# Patient Record
Sex: MALE | Race: WHITE | NOT HISPANIC OR LATINO | Employment: OTHER | URBAN - METROPOLITAN AREA
[De-identification: names, ages, dates, MRNs, and addresses within clinical notes are randomized per-mention and may not be internally consistent; named-entity substitution may affect disease eponyms.]

---

## 2021-12-15 ENCOUNTER — HOSPITAL ENCOUNTER (EMERGENCY)
Facility: HOSPITAL | Age: 61
Discharge: HOME | End: 2021-12-15
Attending: STUDENT IN AN ORGANIZED HEALTH CARE EDUCATION/TRAINING PROGRAM | Admitting: STUDENT IN AN ORGANIZED HEALTH CARE EDUCATION/TRAINING PROGRAM

## 2021-12-15 ENCOUNTER — APPOINTMENT (OUTPATIENT)
Dept: GENERAL RADIOLOGY | Facility: HOSPITAL | Age: 61
End: 2021-12-15

## 2021-12-15 VITALS
TEMPERATURE: 98.4 F | HEART RATE: 69 BPM | BODY MASS INDEX: 25.74 KG/M2 | OXYGEN SATURATION: 95 % | HEIGHT: 67 IN | WEIGHT: 164 LBS | DIASTOLIC BLOOD PRESSURE: 81 MMHG | SYSTOLIC BLOOD PRESSURE: 124 MMHG | RESPIRATION RATE: 18 BRPM

## 2021-12-15 DIAGNOSIS — R07.9 CHEST PAIN, UNSPECIFIED TYPE: Primary | ICD-10-CM

## 2021-12-15 DIAGNOSIS — K30 ACID INDIGESTION: ICD-10-CM

## 2021-12-15 DIAGNOSIS — K21.9 GASTROESOPHAGEAL REFLUX DISEASE, UNSPECIFIED WHETHER ESOPHAGITIS PRESENT: ICD-10-CM

## 2021-12-15 LAB
ALBUMIN SERPL-MCNC: 4.3 G/DL (ref 3.5–5.2)
ALBUMIN/GLOB SERPL: 1.3 G/DL
ALP SERPL-CCNC: 70 U/L (ref 39–117)
ALT SERPL W P-5'-P-CCNC: 6 U/L (ref 1–41)
ANION GAP SERPL CALCULATED.3IONS-SCNC: 10 MMOL/L (ref 5–15)
AST SERPL-CCNC: 13 U/L (ref 1–40)
BASOPHILS # BLD AUTO: 0.1 10*3/MM3 (ref 0–0.2)
BASOPHILS NFR BLD AUTO: 1.2 % (ref 0–1.5)
BILIRUB SERPL-MCNC: 0.2 MG/DL (ref 0–1.2)
BUN SERPL-MCNC: 11 MG/DL (ref 8–23)
BUN/CREAT SERPL: 12.5 (ref 7–25)
CALCIUM SPEC-SCNC: 9.5 MG/DL (ref 8.6–10.5)
CHLORIDE SERPL-SCNC: 101 MMOL/L (ref 98–107)
CO2 SERPL-SCNC: 29 MMOL/L (ref 22–29)
CREAT SERPL-MCNC: 0.88 MG/DL (ref 0.76–1.27)
DEPRECATED RDW RBC AUTO: 43.5 FL (ref 37–54)
EOSINOPHIL # BLD AUTO: 0.18 10*3/MM3 (ref 0–0.4)
EOSINOPHIL NFR BLD AUTO: 2.2 % (ref 0.3–6.2)
ERYTHROCYTE [DISTWIDTH] IN BLOOD BY AUTOMATED COUNT: 13.3 % (ref 12.3–15.4)
GFR SERPL CREATININE-BSD FRML MDRD: 88 ML/MIN/1.73
GLOBULIN UR ELPH-MCNC: 3.2 GM/DL
GLUCOSE SERPL-MCNC: 112 MG/DL (ref 65–99)
HCT VFR BLD AUTO: 46.1 % (ref 37.5–51)
HGB BLD-MCNC: 15.4 G/DL (ref 13–17.7)
IMM GRANULOCYTES # BLD AUTO: 0.02 10*3/MM3 (ref 0–0.05)
IMM GRANULOCYTES NFR BLD AUTO: 0.2 % (ref 0–0.5)
LYMPHOCYTES # BLD AUTO: 2.13 10*3/MM3 (ref 0.7–3.1)
LYMPHOCYTES NFR BLD AUTO: 26.4 % (ref 19.6–45.3)
MAGNESIUM SERPL-MCNC: 2.3 MG/DL (ref 1.6–2.4)
MCH RBC QN AUTO: 29.7 PG (ref 26.6–33)
MCHC RBC AUTO-ENTMCNC: 33.4 G/DL (ref 31.5–35.7)
MCV RBC AUTO: 89 FL (ref 79–97)
MONOCYTES # BLD AUTO: 0.45 10*3/MM3 (ref 0.1–0.9)
MONOCYTES NFR BLD AUTO: 5.6 % (ref 5–12)
NEUTROPHILS NFR BLD AUTO: 5.18 10*3/MM3 (ref 1.7–7)
NEUTROPHILS NFR BLD AUTO: 64.4 % (ref 42.7–76)
NRBC BLD AUTO-RTO: 0 /100 WBC (ref 0–0.2)
NT-PROBNP SERPL-MCNC: 133.8 PG/ML (ref 0–900)
PLATELET # BLD AUTO: 272 10*3/MM3 (ref 140–450)
PMV BLD AUTO: 10.9 FL (ref 6–12)
POTASSIUM SERPL-SCNC: 4.1 MMOL/L (ref 3.5–5.2)
PROT SERPL-MCNC: 7.5 G/DL (ref 6–8.5)
RBC # BLD AUTO: 5.18 10*6/MM3 (ref 4.14–5.8)
SODIUM SERPL-SCNC: 140 MMOL/L (ref 136–145)
TROPONIN T SERPL-MCNC: <0.01 NG/ML (ref 0–0.03)
WBC NRBC COR # BLD: 8.06 10*3/MM3 (ref 3.4–10.8)

## 2021-12-15 PROCEDURE — 80053 COMPREHEN METABOLIC PANEL: CPT | Performed by: STUDENT IN AN ORGANIZED HEALTH CARE EDUCATION/TRAINING PROGRAM

## 2021-12-15 PROCEDURE — 83880 ASSAY OF NATRIURETIC PEPTIDE: CPT | Performed by: STUDENT IN AN ORGANIZED HEALTH CARE EDUCATION/TRAINING PROGRAM

## 2021-12-15 PROCEDURE — 36415 COLL VENOUS BLD VENIPUNCTURE: CPT

## 2021-12-15 PROCEDURE — 99284 EMERGENCY DEPT VISIT MOD MDM: CPT

## 2021-12-15 PROCEDURE — 83735 ASSAY OF MAGNESIUM: CPT | Performed by: STUDENT IN AN ORGANIZED HEALTH CARE EDUCATION/TRAINING PROGRAM

## 2021-12-15 PROCEDURE — 71045 X-RAY EXAM CHEST 1 VIEW: CPT

## 2021-12-15 PROCEDURE — 25010000002 KETOROLAC TROMETHAMINE PER 15 MG: Performed by: STUDENT IN AN ORGANIZED HEALTH CARE EDUCATION/TRAINING PROGRAM

## 2021-12-15 PROCEDURE — 96374 THER/PROPH/DIAG INJ IV PUSH: CPT

## 2021-12-15 PROCEDURE — 84484 ASSAY OF TROPONIN QUANT: CPT | Performed by: STUDENT IN AN ORGANIZED HEALTH CARE EDUCATION/TRAINING PROGRAM

## 2021-12-15 PROCEDURE — 85025 COMPLETE CBC W/AUTO DIFF WBC: CPT | Performed by: STUDENT IN AN ORGANIZED HEALTH CARE EDUCATION/TRAINING PROGRAM

## 2021-12-15 PROCEDURE — 93005 ELECTROCARDIOGRAM TRACING: CPT | Performed by: STUDENT IN AN ORGANIZED HEALTH CARE EDUCATION/TRAINING PROGRAM

## 2021-12-15 RX ORDER — ALUMINA, MAGNESIA, AND SIMETHICONE 2400; 2400; 240 MG/30ML; MG/30ML; MG/30ML
15 SUSPENSION ORAL ONCE
Status: COMPLETED | OUTPATIENT
Start: 2021-12-15 | End: 2021-12-15

## 2021-12-15 RX ORDER — FAMOTIDINE 20 MG/1
20 TABLET, FILM COATED ORAL 2 TIMES DAILY
Qty: 60 TABLET | Refills: 0 | Status: SHIPPED | OUTPATIENT
Start: 2021-12-15 | End: 2022-01-14

## 2021-12-15 RX ORDER — KETOROLAC TROMETHAMINE 30 MG/ML
15 INJECTION, SOLUTION INTRAMUSCULAR; INTRAVENOUS ONCE
Status: COMPLETED | OUTPATIENT
Start: 2021-12-15 | End: 2021-12-15

## 2021-12-15 RX ORDER — LIDOCAINE HYDROCHLORIDE 20 MG/ML
15 SOLUTION OROPHARYNGEAL ONCE
Status: COMPLETED | OUTPATIENT
Start: 2021-12-15 | End: 2021-12-15

## 2021-12-15 RX ADMIN — KETOROLAC TROMETHAMINE 15 MG: 30 INJECTION, SOLUTION INTRAMUSCULAR at 03:55

## 2021-12-15 RX ADMIN — ALUMINUM HYDROXIDE, MAGNESIUM HYDROXIDE, AND DIMETHICONE 15 ML: 400; 400; 40 SUSPENSION ORAL at 03:56

## 2021-12-15 RX ADMIN — LIDOCAINE HYDROCHLORIDE 15 ML: 20 SOLUTION OROPHARYNGEAL at 03:55

## 2021-12-15 NOTE — DISCHARGE INSTRUCTIONS
Take the provided antacid medication as I think it may help with your symptoms.  Please return to the ED or seek other medical care if symptoms worsen or change in any way.  You can contact the provided number to establish with a PCP.  It was a pleasure taking care of you today.

## 2021-12-16 NOTE — ED PROVIDER NOTES
EMERGENCY DEPARTMENT ENCOUNTER    Pt Name: Placido Tinoco  MRN: [id number]  Pt :   1960  Room Number:    Date of encounter:  12/15/2021  PCP: Provider, No Known  ED Provider: Mich Avila MD    Historian: Patient      HPI:  Chief Complaint: Chest pain        Context: Placido Tinoco is a 61-year-old male who presents because of acute but now resolved severe right-sided chest pain.  He says he has been dealing with this for years and is intermittent sometimes going away for months at a time.  The pain can last anywhere from 1 minute to 1 hour.  He says around midnight he immediately felt severe, burning right-sided chest pain.  It was almost as though he was having difficulty swallowing.  The pain was severe and extending from his throat down to his right chest.  He says he has episodes of burping that are associated with this.  He initially tried to relax but the pain was not relieved so he contacted EMS.  He says the pain lasted about an hour but at this point he is asymptomatic and denies pain or any other symptoms and says he feels well and is asking for a cup of coffee.  No other complaints at this time.    PAST MEDICAL HISTORY  Past Medical History:   Diagnosis Date   • Cancer (CMS/HCC)    • Hypertension          PAST SURGICAL HISTORY  No past surgical history on file.      FAMILY HISTORY  No family history on file.      SOCIAL HISTORY  Social History     Socioeconomic History   • Marital status:    Tobacco Use   • Smoking status: Heavy Tobacco Smoker     Packs/day: 4.00   Substance and Sexual Activity   • Alcohol use: No   • Drug use: No   • Sexual activity: Defer         ALLERGIES  Patient has no known allergies.        REVIEW OF SYSTEMS  Review of Systems       All systems reviewed and negative except for those discussed in HPI.       PHYSICAL EXAM    I have reviewed the triage vital signs and nursing notes.    ED Triage Vitals [12/15/21 3602]   Temp Heart Rate Resp BP SpO2    98.4 °F (36.9 °C) 64 18 101/53 95 %      Temp src Heart Rate Source Patient Position BP Location FiO2 (%)   Oral Monitor Sitting Right arm --       Physical Exam  GENERAL:   Appears awake and alert in no acute distress  HENT: Nares patent.  EYES: No scleral icterus.  CV: Regular rhythm, regular rate.  RESPIRATORY: Normal effort.  No audible wheezes, rales or rhonchi.  ABDOMEN: Soft, nontender  MUSCULOSKELETAL: No deformities.   NEURO: Alert, moves all extremities, follows commands.  SKIN: Warm, dry, no rash visualized.        LAB RESULTS  No results found for this or any previous visit (from the past 24 hour(s)).    If labs were ordered, I independently reviewed the results.        RADIOLOGY  No Radiology Exams Resulted Within Past 24 Hours    I ordered and reviewed the above noted radiographic studies.      I viewed images of chest x-ray which was clear with no significant cardiomegaly or other abnormalities that I can appreciate.  See radiologist's dictation for official interpretation.        PROCEDURES    Procedures    ECG 12 Lead    (Results Pending)       MEDICATIONS GIVEN IN ER    Medications   ketorolac (TORADOL) injection 15 mg (15 mg Intravenous Given 12/15/21 0355)   aluminum-magnesium hydroxide-simethicone (MAALOX MAX) 400-400-40 MG/5ML suspension 15 mL (15 mL Oral Given 12/15/21 0356)     And   Lidocaine Viscous HCl (XYLOCAINE) 2 % solution 15 mL (15 mL Mouth/Throat Given 12/15/21 0355)         PROGRESS, DATA ANALYSIS, CONSULTS, AND MEDICAL DECISION MAKING    All labs have been independently reviewed by me.  All radiology studies have been reviewed by me and the radiologist dictating the report.   EKG's have been independently viewed and interpreted by me.      Differential diagnoses: ACS, angina, pneumothorax, pneumonia, GERD, CHF, arrhythmia, electrolyte abnormality      ED Course as of 12/16/21 0907   Wed Dec 15, 2021   6319 In summary this is a 61-year-old male who presents because of acute but  now resolved severe right-sided chest pain.  He says he has been dealing with this for years and is intermittent sometimes going away for months at a time.  The pain can last anywhere from 1 minute to 1 hour.  He says around midnight he immediately felt severe, burning right-sided chest pain.  It was almost as though he was having difficulty swallowing.  The pain was severe and extending from his throat down to his right chest.  He says he has episodes of burping that are associated with this.  He initially tried to relax but the pain was not relieved so he contacted EMS.  He says the pain lasted about an hour but at this point he is asymptomatic and denies pain or any other symptoms and says he feels well and is asking for a cup of coffee.  No other complaints at this time. [CC]   0339 Patient arrived hemodynamically stable vitals within normal limits and well-appearing.  Initial EKG reveals normal sinus rhythm, rate, axis, no acute ST elevations or depressions or any abnormalities that I can appreciate. [CC]   0339 This sounds like an esophageal injury or GERD to me and treating symptomatically with a GI cocktail as well as Toradol.  Obtaining CBC CMP high-sensitivity troponin, BNP, and magnesium as well as a chest x-ray.  Will reevaluate pending initial work-up. [CC]      ED Course User Index  [CC] Mich Avila MD     Chest x-ray appears clear to me but was read as possible infiltrate versus atelectasis on the left.  He is afebrile and does not have symptoms consistent with pneumonia and I believe this is atelectasis.  He has no leukocytosis.  Labs reassuring and nonactionable.  Troponin and BNP are not elevated.  He remains asymptomatic.  Heart score is 2.  I counseled him that I think there may be a GERD component to this and provided a prescription for famotidine which she will try.  He is agreeable to discharge with return precautions.  Counseled on return precautions verbally expressed  understanding of these.        AS OF 09:07 EST VITALS:    BP - 124/81  HR - 69  TEMP - 98.4 °F (36.9 °C) (Oral)  O2 SATS - 95%                  DIAGNOSIS  Final diagnoses:   Chest pain, unspecified type   Gastroesophageal reflux disease, unspecified whether esophagitis present   Acid indigestion         DISPOSITION  DISCHARGE    Patient discharged in stable condition.    Reviewed implications of results, diagnosis, meds, responsibility to follow up, warning signs and symptoms of possible worsening, potential complications and reasons to return to ER.    Patient/Family voiced understanding of above instructions.    Discussed plan for discharge, as there is no emergent indication for admission.  Pt/family is agreeable and understands need for follow up and possible repeat testing.  Pt/family is aware that discharge does not mean that nothing is wrong but that it indicates no emergency is currently present that requires admission and they must continue care with follow-up as given below or with a physician of their choice.     FOLLOW-UP  PATIENT CONNECTION - [place] [address]  [phone]  Call       Provider, No Known  [place] [address]    Call            Medication List      New Prescriptions    famotidine 20 MG tablet  Commonly known as: PEPCID  Take 1 tablet by mouth 2 (Two) Times a Day for 30 days.           Where to Get Your Medications      These medications were sent to [facility] [place] [address] [address] - [phone] PH - [phone] FX  [address], [facility] [address]    Phone: [phone]   · famotidine 20 MG tablet                    Mich Avila MD  12/16/21 7080

## 2025-04-12 ENCOUNTER — HOSPITAL ENCOUNTER (EMERGENCY)
Facility: HOSPITAL | Age: 65
Discharge: HOME | End: 2025-04-13
Attending: EMERGENCY MEDICINE
Payer: COMMERCIAL

## 2025-04-12 ENCOUNTER — APPOINTMENT (OUTPATIENT)
Dept: GENERAL RADIOLOGY | Facility: HOSPITAL | Age: 65
End: 2025-04-12
Payer: COMMERCIAL

## 2025-04-12 VITALS
RESPIRATION RATE: 18 BRPM | WEIGHT: 160 LBS | OXYGEN SATURATION: 93 % | HEART RATE: 87 BPM | HEIGHT: 67 IN | TEMPERATURE: 98.8 F | DIASTOLIC BLOOD PRESSURE: 76 MMHG | BODY MASS INDEX: 25.11 KG/M2 | SYSTOLIC BLOOD PRESSURE: 148 MMHG

## 2025-04-12 DIAGNOSIS — B34.9 ACUTE VIRAL SYNDROME: Primary | ICD-10-CM

## 2025-04-12 DIAGNOSIS — J12.82 PNEUMONIA DUE TO COVID-19 VIRUS: ICD-10-CM

## 2025-04-12 DIAGNOSIS — J18.9 COMMUNITY ACQUIRED PNEUMONIA OF BOTH LOWER LOBES: ICD-10-CM

## 2025-04-12 DIAGNOSIS — U07.1 PNEUMONIA DUE TO COVID-19 VIRUS: ICD-10-CM

## 2025-04-12 LAB — GLUCOSE BLDC GLUCOMTR-MCNC: 92 MG/DL (ref 70–130)

## 2025-04-12 PROCEDURE — 99284 EMERGENCY DEPT VISIT MOD MDM: CPT

## 2025-04-12 PROCEDURE — 85025 COMPLETE CBC W/AUTO DIFF WBC: CPT | Performed by: EMERGENCY MEDICINE

## 2025-04-12 PROCEDURE — 71045 X-RAY EXAM CHEST 1 VIEW: CPT

## 2025-04-12 PROCEDURE — 82948 REAGENT STRIP/BLOOD GLUCOSE: CPT

## 2025-04-12 PROCEDURE — 80053 COMPREHEN METABOLIC PANEL: CPT | Performed by: EMERGENCY MEDICINE

## 2025-04-12 PROCEDURE — 84484 ASSAY OF TROPONIN QUANT: CPT | Performed by: EMERGENCY MEDICINE

## 2025-04-12 PROCEDURE — 83735 ASSAY OF MAGNESIUM: CPT | Performed by: EMERGENCY MEDICINE

## 2025-04-12 RX ORDER — SODIUM CHLORIDE 0.9 % (FLUSH) 0.9 %
10 SYRINGE (ML) INJECTION AS NEEDED
Status: DISCONTINUED | OUTPATIENT
Start: 2025-04-12 | End: 2025-04-13 | Stop reason: HOSPADM

## 2025-04-13 LAB
ALBUMIN SERPL-MCNC: 4.1 G/DL (ref 3.5–5.2)
ALBUMIN/GLOB SERPL: 1.4 G/DL
ALP SERPL-CCNC: 63 U/L (ref 39–117)
ALT SERPL W P-5'-P-CCNC: 8 U/L (ref 1–41)
ANION GAP SERPL CALCULATED.3IONS-SCNC: 9 MMOL/L (ref 5–15)
AST SERPL-CCNC: 17 U/L (ref 1–40)
BASOPHILS # BLD AUTO: 0.01 10*3/MM3 (ref 0–0.2)
BASOPHILS NFR BLD AUTO: 0.1 % (ref 0–1.5)
BILIRUB SERPL-MCNC: 0.3 MG/DL (ref 0–1.2)
BUN SERPL-MCNC: 9 MG/DL (ref 8–23)
BUN/CREAT SERPL: 20.5 (ref 7–25)
CALCIUM SPEC-SCNC: 8.9 MG/DL (ref 8.6–10.5)
CHLORIDE SERPL-SCNC: 95 MMOL/L (ref 98–107)
CO2 SERPL-SCNC: 31 MMOL/L (ref 22–29)
CREAT SERPL-MCNC: 0.44 MG/DL (ref 0.76–1.27)
DEPRECATED RDW RBC AUTO: 47.3 FL (ref 37–54)
EGFRCR SERPLBLD CKD-EPI 2021: 118.4 ML/MIN/1.73
EOSINOPHIL # BLD AUTO: 0.05 10*3/MM3 (ref 0–0.4)
EOSINOPHIL NFR BLD AUTO: 0.7 % (ref 0.3–6.2)
ERYTHROCYTE [DISTWIDTH] IN BLOOD BY AUTOMATED COUNT: 14 % (ref 12.3–15.4)
GLOBULIN UR ELPH-MCNC: 3 GM/DL
GLUCOSE SERPL-MCNC: 117 MG/DL (ref 65–99)
HCT VFR BLD AUTO: 40.2 % (ref 37.5–51)
HGB BLD-MCNC: 12.7 G/DL (ref 13–17.7)
HOLD SPECIMEN: NORMAL
IMM GRANULOCYTES # BLD AUTO: 0.01 10*3/MM3 (ref 0–0.05)
IMM GRANULOCYTES NFR BLD AUTO: 0.1 % (ref 0–0.5)
LYMPHOCYTES # BLD AUTO: 0.92 10*3/MM3 (ref 0.7–3.1)
LYMPHOCYTES NFR BLD AUTO: 13.7 % (ref 19.6–45.3)
MAGNESIUM SERPL-MCNC: 2.4 MG/DL (ref 1.6–2.4)
MCH RBC QN AUTO: 29.1 PG (ref 26.6–33)
MCHC RBC AUTO-ENTMCNC: 31.6 G/DL (ref 31.5–35.7)
MCV RBC AUTO: 92.2 FL (ref 79–97)
MONOCYTES # BLD AUTO: 0.49 10*3/MM3 (ref 0.1–0.9)
MONOCYTES NFR BLD AUTO: 7.3 % (ref 5–12)
NEUTROPHILS NFR BLD AUTO: 5.23 10*3/MM3 (ref 1.7–7)
NEUTROPHILS NFR BLD AUTO: 78.1 % (ref 42.7–76)
NRBC BLD AUTO-RTO: 0 /100 WBC (ref 0–0.2)
PLATELET # BLD AUTO: 115 10*3/MM3 (ref 140–450)
PMV BLD AUTO: 10.9 FL (ref 6–12)
POTASSIUM SERPL-SCNC: 4.1 MMOL/L (ref 3.5–5.2)
PROT SERPL-MCNC: 7.1 G/DL (ref 6–8.5)
RBC # BLD AUTO: 4.36 10*6/MM3 (ref 4.14–5.8)
SODIUM SERPL-SCNC: 135 MMOL/L (ref 136–145)
TROPONIN T SERPL HS-MCNC: 10 NG/L
WBC NRBC COR # BLD AUTO: 6.71 10*3/MM3 (ref 3.4–10.8)
WHOLE BLOOD HOLD COAG: NORMAL
WHOLE BLOOD HOLD SPECIMEN: NORMAL

## 2025-04-13 RX ORDER — DOXYCYCLINE 100 MG/1
100 CAPSULE ORAL ONCE
Status: COMPLETED | OUTPATIENT
Start: 2025-04-13 | End: 2025-04-13

## 2025-04-13 RX ORDER — DOXYCYCLINE 100 MG/1
100 CAPSULE ORAL 2 TIMES DAILY
Qty: 10 CAPSULE | Refills: 0 | Status: SHIPPED | OUTPATIENT
Start: 2025-04-13 | End: 2025-04-18

## 2025-04-13 RX ADMIN — DOXYCYCLINE 100 MG: 100 CAPSULE ORAL at 00:29

## 2025-04-13 NOTE — ED PROVIDER NOTES
EMERGENCY DEPARTMENT ENCOUNTER    Pt Name: Placido Tinoco  MRN: [id number]  Pt :   1960  Room Number:    Date of encounter:  2025  PCP: Provider, No Known  ED Provider: FRANCISCO Turner    Historian: Patient    HPI:  Chief Complaint: Shortness of breath and cough     Context: Placido Tinoco is a 64 y.o. male who presents to the ED c/o shortness of breath and cough.  Patient also notes that he is experienced symptoms of headache and dizziness for the last several months that seem to be worse over the last few days.  He does report that he was recently seen and evaluated at the [facility] where he was diagnosed with COVID-19 5 days ago.  No additional complaints on interview and exam.  HPI     REVIEW OF SYSTEMS  A chief complaint appropriate review of systems was completed and is negative except as noted in the HPI.     PAST MEDICAL HISTORY  Past Medical History:   Diagnosis Date    Cancer     Hypertension        PAST SURGICAL HISTORY  No past surgical history on file.    FAMILY HISTORY  No family history on file.    SOCIAL HISTORY  Social History     Socioeconomic History    Marital status:    Tobacco Use    Smoking status: Heavy Smoker     Current packs/day: 4.00     Types: Cigarettes   Substance and Sexual Activity    Alcohol use: No    Drug use: No    Sexual activity: Defer       ALLERGIES  Patient has no known allergies.    PHYSICAL EXAM  Physical Exam  GENERAL:   Appears in no acute distress. Non-toxic in appearance.  Disheveled in appearance  HENT: Nares patent.  EYES: No periorbital edema, erythema, or lesions. Conjunctiva clear, sclera white. Extraocular movements intact.   CV: Regular rhythm, regular rate.  RESPIRATORY: Normal effort.  No audible wheezes, rales or rhonchi.  ABDOMEN: Soft, nontender  MUSCULOSKELETAL: No deformities.   NEURO: Alert, moves all extremities, follows commands.  SKIN: Warm, dry, no rash visualized.  I have reviewed the triage vital signs  and nursing notes.     LAB RESULTS  Results for orders placed or performed during the hospital encounter of 04/12/25   POC Glucose Once    Collection Time: 04/12/25 11:43 PM    Specimen: Blood   Result Value Ref Range    Glucose 92 70 - 130 mg/dL   Comprehensive Metabolic Panel    Collection Time: 04/12/25 11:45 PM    Specimen: Blood   Result Value Ref Range    Glucose 117 (H) 65 - 99 mg/dL    BUN 9 8 - 23 mg/dL    Creatinine 0.44 (L) 0.76 - 1.27 mg/dL    Sodium 135 (L) 136 - 145 mmol/L    Potassium 4.1 3.5 - 5.2 mmol/L    Chloride 95 (L) 98 - 107 mmol/L    CO2 31.0 (H) 22.0 - 29.0 mmol/L    Calcium 8.9 8.6 - 10.5 mg/dL    Total Protein 7.1 6.0 - 8.5 g/dL    Albumin 4.1 3.5 - 5.2 g/dL    ALT (SGPT) 8 1 - 41 U/L    AST (SGOT) 17 1 - 40 U/L    Alkaline Phosphatase 63 39 - 117 U/L    Total Bilirubin 0.3 0.0 - 1.2 mg/dL    Globulin 3.0 gm/dL    A/G Ratio 1.4 g/dL    BUN/Creatinine Ratio 20.5 7.0 - 25.0    Anion Gap 9.0 5.0 - 15.0 mmol/L    eGFR 118.4 >60.0 mL/min/1.73   High Sensitivity Troponin T    Collection Time: 04/12/25 11:45 PM    Specimen: Blood   Result Value Ref Range    HS Troponin T 10 <22 ng/L   Magnesium    Collection Time: 04/12/25 11:45 PM    Specimen: Blood   Result Value Ref Range    Magnesium 2.4 1.6 - 2.4 mg/dL   CBC Auto Differential    Collection Time: 04/12/25 11:45 PM    Specimen: Blood   Result Value Ref Range    WBC 6.71 3.40 - 10.80 10*3/mm3    RBC 4.36 4.14 - 5.80 10*6/mm3    Hemoglobin 12.7 (L) 13.0 - 17.7 g/dL    Hematocrit 40.2 37.5 - 51.0 %    MCV 92.2 79.0 - 97.0 fL    MCH 29.1 26.6 - 33.0 pg    MCHC 31.6 31.5 - 35.7 g/dL    RDW 14.0 12.3 - 15.4 %    RDW-SD 47.3 37.0 - 54.0 fl    MPV 10.9 6.0 - 12.0 fL    Platelets 115 (L) 140 - 450 10*3/mm3    Neutrophil % 78.1 (H) 42.7 - 76.0 %    Lymphocyte % 13.7 (L) 19.6 - 45.3 %    Monocyte % 7.3 5.0 - 12.0 %    Eosinophil % 0.7 0.3 - 6.2 %    Basophil % 0.1 0.0 - 1.5 %    Immature Grans % 0.1 0.0 - 0.5 %    Neutrophils, Absolute 5.23 1.70 - 7.00  10*3/mm3    Lymphocytes, Absolute 0.92 0.70 - 3.10 10*3/mm3    Monocytes, Absolute 0.49 0.10 - 0.90 10*3/mm3    Eosinophils, Absolute 0.05 0.00 - 0.40 10*3/mm3    Basophils, Absolute 0.01 0.00 - 0.20 10*3/mm3    Immature Grans, Absolute 0.01 0.00 - 0.05 10*3/mm3    nRBC 0.0 0.0 - 0.2 /100 WBC   Green Top (Gel)    Collection Time: 04/12/25 11:45 PM   Result Value Ref Range    Extra Tube Hold for add-ons.    Lavender Top    Collection Time: 04/12/25 11:45 PM   Result Value Ref Range    Extra Tube hold for add-on    Gold Top - SST    Collection Time: 04/12/25 11:45 PM   Result Value Ref Range    Extra Tube Hold for add-ons.    Gray Top    Collection Time: 04/12/25 11:45 PM   Result Value Ref Range    Extra Tube Hold for add-ons.    Light Blue Top    Collection Time: 04/12/25 11:45 PM   Result Value Ref Range    Extra Tube Hold for add-ons.        If labs were ordered, I independently reviewed the results and considered them in treating the patient.    RADIOLOGY  XR Chest 1 View   Final Result   Impression:   Questionable mild patchy airspace disease within the bilateral lower lobes, right greater than left, likely related to a mild pneumonia.            Electronically Signed: Twyla Zamudio MD     4/12/2025 11:35 PM EDT     Workstation ID: [id number]        [x] Radiologist's Report Reviewed:  I ordered and independently interpreted the above noted radiographic studies.  See radiologist's dictation for official interpretation.      PROCEDURES    Procedures    No orders to display       MEDICATIONS GIVEN IN ER    Medications   sodium chloride 0.9 % flush 10 mL (has no administration in time range)   doxycycline (MONODOX) capsule 100 mg (100 mg Oral Given 4/13/25 0029)       MEDICAL DECISION MAKING, PROGRESS, and CONSULTS   Medical Decision Making  64-year-old homeless male currently living in his car with a past medical history of hypertension, hyperlipidemia, COPD, coronary artery disease, substance use disorder (on  methadone), and left eye blindness presented to the emergency department with complaints of headache, cough, and shortness of breath. He was recently evaluated at the [facility] ED and diagnosed with COVID-19. Today's workup revealed no acute emergent findings on laboratory studies, and the patient declined an EKG. A chest X-ray showed questionable mild patchy airspace disease in the bilateral lower lobes, more prominent on the right, suggestive of mild pneumonia. The patient was nontoxic-appearing with stable vital signs, no acute respiratory distress, and no hypoxia. He received an initial dose of antibiotics in the ED and was prescribed antibiotics for outpatient treatment of pneumonia. The patient was instructed to follow up with his primary care provider and provided return precautions at the time of discharge.    Problems Addressed:  Acute viral syndrome: complicated acute illness or injury  Community acquired pneumonia of both lower lobes: complicated acute illness or injury  Pneumonia due to COVID-19 virus: complicated acute illness or injury    Amount and/or Complexity of Data Reviewed  External Data Reviewed: notes.  Labs: ordered. Decision-making details documented in ED Course.  Radiology: ordered and independent interpretation performed. Decision-making details documented in ED Course.    Risk  Prescription drug management.    Discussion below represents my analysis of pertinent findings related to patient's condition, differential diagnosis, treatment plan and final disposition.    Differential diagnosis: Viral upper respiratory infection, bacterial pneumonia, sinusitis, allergic rhinitis, influenza, COVID-19, ACS, CHF or COPD exacerbations, pneumothorax.     Additional sources  Discussed/ obtained information from independent historians:   [] Spouse  [] Parent  [] Family member  [] Friend  [] EMS   [] Other:  External (non-ED) record review:   [] Inpatient record:   [] Office record:   []  Outpatient record:   [] Prior Outpatient labs:   [] Prior Outpatient radiology:   [] Primary Care record:   [x] Outside ED record: I personally reviewed patient's prior hospital encounter with emergency medicine in [facility] on April 8, 2025 with diagnosis of COVID-19.   [] Other:   Patient's care impacted by:   [] Diabetes  [x] Hypertension  [x] Hyperlipidemia  [] Hypothyroidism   [x] Coronary Artery Disease  [] Congestive Heart Failure   [x] COPD   [] Cancer   [] Obesity  [] GERD   [] Tobacco Abuse   [x] Substance Abuse on methadone   [] Anxiety   [] Depression   [] Other:   Care significantly affected by Social Determinants of Health (housing and economic circumstances, unemployment)    [x] Yes     [] No   If yes, Patient's care significantly limited by  Social Determinants of Health including:   [x] Inadequate housing: Homeless and living in his car   [] Low income   [] Alcoholism and drug addiction in family   [] Problems related to primary support group   [] Unemployment   [] Problems related to employment   [] Other Social Determinants of Health:     Orders placed during this visit:  Orders Placed This Encounter   Procedures    XR Chest 1 View    [place] Draw    Comprehensive Metabolic Panel    High Sensitivity Troponin T    Magnesium    CBC Auto Differential    NPO Diet NPO Type: Strict NPO    Undress & Gown    Continuous Pulse Oximetry    Vital Signs    Oxygen Therapy- Nasal Cannula; Titrate 1-6 LPM Per SpO2; 90 - 95%    POC Glucose Once    POC Glucose Once    Insert Peripheral IV    Fall Precautions    CBC & Differential    Green Top (Gel)    Lavender Top    Gold Top - SST    Gray Top    Light Blue Top   I considered prescription management  with:   [] Pain medication  [] Antiviral  [x] Antibiotic: Implemented as prescribed for bilateral lower lobe pneumonia   [] Other:   Rationale:    ED Course:    ED Course as of 04/13/25 0214   Sat Apr 12, 2025   2329 Vitals and Telemetry tracing was  reviewed and directly interpreted by myself demonstrating blood pressure 148/76, temperature 98.8 °F, heart of 87, respirations 18 breaths minute and oxygen saturation 93% on room air [JG]   2329 BP: 148/76 [JG]   2329 Temp: 98.8 °F (37.1 °C) [JG]   2329 Heart Rate: 87 [JG]   2329 Resp: 18 [JG]   2329 SpO2: 93 % [JG]   2359 XR Chest 1 View  Imaging of chest personally interpreted by myself with official read provided by radiology demonstrated Questionable mild patchy airspace disease within the bilateral lower lobes, right greater than left, likely related to a mild pneumonia; recent diagnosis of COVID-19 [JG]   Sun Apr 13, 2025   0045 Labs studies were reviewed and directly interpreted by myself and demonstrated no acute or emergent abnormalities.   [JG]      ED Course User Index  [JG] Jv Rodriguez, PA          DIAGNOSIS  Final diagnoses:   Acute viral syndrome   Pneumonia due to COVID-19 virus   Community acquired pneumonia of both lower lobes     DISPOSITION    ED Disposition       ED Disposition   Discharge    Condition   Stable    Comment   --           DISCHARGE    Patient discharged in stable condition.    Reviewed implications of results, diagnosis, meds, responsibility to follow up, warning signs and symptoms of possible worsening, potential complications and reasons to return to ER.    Patient/Family voiced understanding of above instructions.    Discussed plan for discharge, as there is no emergent indication for admission.  Pt/family is agreeable and understands need for follow up and possible repeat testing.  Pt/family is aware that discharge does not mean that nothing is wrong but that it indicates no emergency is currently present that requires admission and they must continue care with follow-up as given below or with a physician of their choice.     FOLLOW-UP  PATIENT CONNECTION - [facility] [address]  [phone]  Call   Call to establish follow-up with primary care    [employer]  [facility] EMERGENCY DEPARTMENT  [address]  [facility] [address] [phone]  Go to   If symptoms worsen         Medication List        New Prescriptions      doxycycline 100 MG capsule  Commonly known as: MONODOX  Take 1 capsule by mouth 2 (Two) Times a Day for 5 days.               Where to Get Your Medications        These medications were sent to [facility] - [place], [place] - [address] - [phone] [facility] [phone] FX  [address], [facility] [address]      Phone: [phone]   doxycycline 100 MG capsule          Please note that portions of this document were completed with voice recognition software.        Jv Rodriguez, PA  04/13/25 0214

## 2025-04-13 NOTE — DISCHARGE INSTRUCTIONS
Symptomatic care is recommended. Take all medications as prescribed and instructed.  Take and complete full course of antibiotics as prescribed follow up with primary care as directed or return to Emergency Department with worsening of symptoms.